# Patient Record
(demographics unavailable — no encounter records)

---

## 2025-02-09 NOTE — HEALTH RISK ASSESSMENT
[Very Good] : ~his/her~  mood as very good [0] : 2) Feeling down, depressed, or hopeless: Not at all (0) [PHQ-2 Negative - No further assessment needed] : PHQ-2 Negative - No further assessment needed [Never] : Never [Patient reported mammogram was normal] : Patient reported mammogram was normal [HIV test declined] : HIV test declined [Hepatitis C test declined] : Hepatitis C test declined [] :  [# Of Children ___] : has [unfilled] children [Feels Safe at Home] : Feels safe at home [NO] : No [Patient reported colonoscopy was abnormal] : Patient reported colonoscopy was abnormal [With Family] : lives with family [No] : In the past 12 months have you used drugs other than those required for medical reasons? No [de-identified] : monitoring exercise  [de-identified] : monitoring diet closely [[id number]] : 0 [MammogramDate] : 2/2023 [ColonoscopyDate] : 2023 [ColonoscopyComments] : 2025

## 2025-02-09 NOTE — ASSESSMENT
[FreeTextEntry1] : patient with appropriate preventative UTD  no concerns on exam  age-appropriate counseling given.  prediabetes  - monitor a1c   neuropathy - discussed conservative managment  ENT referral - for changes in hearing

## 2025-02-09 NOTE — HISTORY OF PRESENT ILLNESS
[de-identified] : 51 yr old F prediabetes, neuropathy, presents for annual exam  patient inquiries regarding   off balance for few months

## 2025-02-09 NOTE — HISTORY OF PRESENT ILLNESS
[de-identified] : 51 yr old F prediabetes, neuropathy, presents for annual exam  patient inquiries regarding   off balance for few months

## 2025-02-09 NOTE — HEALTH RISK ASSESSMENT
[Very Good] : ~his/her~  mood as very good [0] : 2) Feeling down, depressed, or hopeless: Not at all (0) [PHQ-2 Negative - No further assessment needed] : PHQ-2 Negative - No further assessment needed [Never] : Never [Patient reported mammogram was normal] : Patient reported mammogram was normal [HIV test declined] : HIV test declined [Hepatitis C test declined] : Hepatitis C test declined [] :  [# Of Children ___] : has [unfilled] children [Feels Safe at Home] : Feels safe at home [NO] : No [Patient reported colonoscopy was abnormal] : Patient reported colonoscopy was abnormal [With Family] : lives with family [No] : In the past 12 months have you used drugs other than those required for medical reasons? No [de-identified] : monitoring exercise  [de-identified] : monitoring diet closely [[id number]] : 0 [MammogramDate] : 2/2023 [ColonoscopyDate] : 2023 [ColonoscopyComments] : 2025

## 2025-04-04 NOTE — ASSESSMENT
[FreeTextEntry1] : L > R shoulder pain. L shoulder with overt supraspinatus weakness concerning for full thickness tear. R shoulder exam largely normal. XR showing inferior spur on acromion on the L. R shoulder XR normal - MRI L shoulder to evaluate for full thickness tear given severe weakness on exam - Meloxicam once daily as needed - Follow up after MRI

## 2025-04-04 NOTE — PHYSICAL EXAM
[de-identified] : Constitutional: Well developed, well nourished, able to communicate Neuro: Normal sensation, No focal deficits Skin: Intact CV: Peripheral vascular exam grossly normal Pulm: No signs of respiratory distress Psych: Oriented, normal mood and affect  L shoulder: - No obvious deformity or swelling - No pain with palpation over AC joint, anterior or posterior shoulder - Forward flexion to 160 degrees, External rotation to 10 degrees, Internal rotation to posterior hip - 5/5 strength with internal and external rotation - Positive Empty can - Positive impingement testing - Positive Speeds - Distally neurovascularly intact     R shoulder: - No obvious deformity or swelling - No pain with palpation over AC joint, anterior or posterior shoulder - Forward flexion to 180 degrees, External rotation to 30 degrees, Internal rotation to T12 - 5/5 strength with internal and external rotation - Negative Empty can - Negative impingement testing - Negative Speeds - Distally neurovascularly intact    [de-identified] : 6 views XR bilateral shoulders without fracture, dislocation. L shoulder with inferior acromion spur into subacromial space